# Patient Record
Sex: FEMALE | Race: ASIAN
[De-identification: names, ages, dates, MRNs, and addresses within clinical notes are randomized per-mention and may not be internally consistent; named-entity substitution may affect disease eponyms.]

---

## 2019-11-18 ENCOUNTER — APPOINTMENT (OUTPATIENT)
Dept: ORTHOPEDIC SURGERY | Facility: CLINIC | Age: 69
End: 2019-11-18
Payer: MEDICARE

## 2019-11-18 DIAGNOSIS — M75.50 BURSITIS OF UNSPECIFIED SHOULDER: ICD-10-CM

## 2019-11-18 PROBLEM — Z00.00 ENCOUNTER FOR PREVENTIVE HEALTH EXAMINATION: Status: ACTIVE | Noted: 2019-11-18

## 2019-11-18 PROCEDURE — 20610 DRAIN/INJ JOINT/BURSA W/O US: CPT | Mod: LT

## 2019-11-18 PROCEDURE — 99213 OFFICE O/P EST LOW 20 MIN: CPT | Mod: 25

## 2019-11-18 NOTE — HISTORY OF PRESENT ILLNESS
[de-identified] : Patient has previously seen Dr. Pimentel for the LEFT shoulder. She reports that the shoulder has recently been bothering her for the past 5 months. 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 She received a cortisone injection from her PCP about 7 months ago.

## 2019-11-18 NOTE — PROCEDURE
[de-identified] : Patient has demonstrated limited relief from NSAIDS, rest, exercises / PT, and after discussion of the risks and benefits, the patient has elected to proceed with a corticosteroid injection into the LEFT shoulder via Posterolateral site.\par Confirmed that the patient does not have history of prior adverse reactions, active, infections, or relevant allergies.   There was no erythema or warmth, and the skin was clear.  The skin was sterilized with alcohol and via sterile technique, the shoulder was injected with 3 cc of 1% xylocaine and 5mg of Kenalog.  The injection was completed without complication and a bandage was applied.  The patient tolerated the procedure well and was given post-injection instructions.\par

## 2019-11-18 NOTE — PHYSICAL EXAM
[de-identified] : Left Shoulder:\par Constitutional:\par The patient is healthy-appearing and in no apparent distress. \par \par Cardiovascular System: \par The capillary refill is less than 2 seconds. \par \par Skin: \par There are no skin abnormalities.\par \par C-Spine/Neck:\par \par Active Range of Motion:\par Flexion				50\par Extension			60\par Lateral rotation			80  \par \par Left Shoulder: \par Inspection: \par There is no atrophy, erythema, warmth, swelling.\par There is no scapular winging.\par There is no AC prominence. \par \par Bony Palpation: \par There is no tenderness of the clavicle.\par There is no tenderness of the acromioclavicular joint.\par There is no tenderness of the greater tuberosity. \par There is no tenderness of the bicipital groove.\par  \par Soft Tissue Palpation: \par There is no tenderness of the trapezius.\par There is no tenderness of the rhomboid.\par There is no tenderness of the subacromial bursa. \par \par Active Range of Motion: \par Forward flexion- 				180 \par Abduction-					150\par External rotation at 0 degrees abduction-	80 \par Internal rotation at 0 degrees abduction-	80\par \par Passive Range of Motion: \par Forward flexion- 			180 \par Abduction-				150\par External rotation at 0 deg abduction-	80 \par Internal rotation at 0 deg abduction-	80\par \par Strength:\par Supraspinatus / Abduction                  5/5\par External rotation                                 5/5\par Internal roation                                   5/5\par \par Special Tests: \par Hawkin's  				Positive \par Neer's  				Positive \par Speed's  				Negative\par AC cross-over 			            Negative\par [place]'s  				Negative\par \par Stability: \par There is no general laxity.  [de-identified] : X-ray left shoulder brought by patient. There is no significant bony abnormality arthritis or fracture\par

## 2019-11-18 NOTE — ASSESSMENT
[FreeTextEntry1] : If no improvement with home exercises and injection patient to contact the office and consideration of formal physical therapy or MRI

## 2019-12-05 ENCOUNTER — NON-APPOINTMENT (OUTPATIENT)
Age: 69
End: 2019-12-05

## 2019-12-05 ENCOUNTER — APPOINTMENT (OUTPATIENT)
Dept: OPHTHALMOLOGY | Facility: CLINIC | Age: 69
End: 2019-12-05
Payer: MEDICARE

## 2019-12-05 PROCEDURE — 92002 INTRM OPH EXAM NEW PATIENT: CPT

## 2022-02-17 ENCOUNTER — APPOINTMENT (OUTPATIENT)
Dept: OPHTHALMOLOGY | Facility: CLINIC | Age: 72
End: 2022-02-17